# Patient Record
Sex: FEMALE | Race: WHITE | NOT HISPANIC OR LATINO | Employment: OTHER | RURAL
[De-identification: names, ages, dates, MRNs, and addresses within clinical notes are randomized per-mention and may not be internally consistent; named-entity substitution may affect disease eponyms.]

---

## 2023-01-16 ENCOUNTER — PATIENT ROUNDING (BHMG ONLY) (OUTPATIENT)
Dept: FAMILY MEDICINE CLINIC | Facility: CLINIC | Age: 68
End: 2023-01-16
Payer: MEDICARE

## 2023-01-16 ENCOUNTER — OFFICE VISIT (OUTPATIENT)
Dept: FAMILY MEDICINE CLINIC | Facility: CLINIC | Age: 68
End: 2023-01-16
Payer: MEDICARE

## 2023-01-16 VITALS
SYSTOLIC BLOOD PRESSURE: 132 MMHG | DIASTOLIC BLOOD PRESSURE: 98 MMHG | TEMPERATURE: 97.1 F | WEIGHT: 150.4 LBS | OXYGEN SATURATION: 97 % | HEIGHT: 65 IN | RESPIRATION RATE: 18 BRPM | BODY MASS INDEX: 25.06 KG/M2 | HEART RATE: 124 BPM

## 2023-01-16 DIAGNOSIS — E11.8 TYPE 2 DIABETES WITH COMPLICATION: ICD-10-CM

## 2023-01-16 DIAGNOSIS — I70.90 ATHEROSCLEROSIS: ICD-10-CM

## 2023-01-16 DIAGNOSIS — Z76.89 ENCOUNTER TO ESTABLISH CARE: Primary | ICD-10-CM

## 2023-01-16 DIAGNOSIS — Z86.73 STATUS POST CVA: ICD-10-CM

## 2023-01-16 DIAGNOSIS — E55.9 VITAMIN D DEFICIENCY: ICD-10-CM

## 2023-01-16 DIAGNOSIS — Z72.0 TOBACCO USER: ICD-10-CM

## 2023-01-16 DIAGNOSIS — I10 ESSENTIAL HYPERTENSION: ICD-10-CM

## 2023-01-16 DIAGNOSIS — E78.2 MIXED HYPERLIPIDEMIA: ICD-10-CM

## 2023-01-16 DIAGNOSIS — Z79.01 ANTICOAGULATED: ICD-10-CM

## 2023-01-16 DIAGNOSIS — M19.90 OSTEOARTHRITIS, UNSPECIFIED OSTEOARTHRITIS TYPE, UNSPECIFIED SITE: ICD-10-CM

## 2023-01-16 DIAGNOSIS — R25.1 TREMOR OF BOTH HANDS: ICD-10-CM

## 2023-01-16 DIAGNOSIS — R60.0 LEG EDEMA: ICD-10-CM

## 2023-01-16 DIAGNOSIS — F41.8 MIXED ANXIETY DEPRESSIVE DISORDER: ICD-10-CM

## 2023-01-16 DIAGNOSIS — J44.9 CHRONIC OBSTRUCTIVE PULMONARY DISEASE, UNSPECIFIED COPD TYPE: ICD-10-CM

## 2023-01-16 PROBLEM — Z00.00 WELLNESS EXAMINATION: Status: ACTIVE | Noted: 2023-01-16

## 2023-01-16 PROBLEM — G43.909 MIGRAINE: Status: ACTIVE | Noted: 2023-01-16

## 2023-01-16 PROBLEM — E78.5 HYPERLIPEMIA: Status: ACTIVE | Noted: 2022-09-14

## 2023-01-16 PROBLEM — I65.29 CAROTID ARTERY STENOSIS: Status: ACTIVE | Noted: 2023-01-16

## 2023-01-16 PROBLEM — E53.8 B12 DEFICIENCY: Status: ACTIVE | Noted: 2023-01-16

## 2023-01-16 PROBLEM — G47.00 INSOMNIA: Status: ACTIVE | Noted: 2023-01-16

## 2023-01-16 PROCEDURE — 99204 OFFICE O/P NEW MOD 45 MIN: CPT | Performed by: FAMILY MEDICINE

## 2023-01-16 RX ORDER — ASPIRIN 81 MG/1
81 TABLET ORAL DAILY
COMMUNITY

## 2023-01-16 RX ORDER — CYANOCOBALAMIN (VITAMIN B-12) 1000 MCG
TABLET ORAL
COMMUNITY

## 2023-01-16 RX ORDER — ATORVASTATIN CALCIUM 80 MG/1
80 TABLET, FILM COATED ORAL NIGHTLY
COMMUNITY
Start: 2022-09-06 | End: 2023-01-17 | Stop reason: SDUPTHER

## 2023-01-16 RX ORDER — INSULIN GLARGINE 100 [IU]/ML
24 INJECTION, SOLUTION SUBCUTANEOUS NIGHTLY
COMMUNITY
Start: 2022-09-06 | End: 2023-01-17 | Stop reason: SDUPTHER

## 2023-01-16 RX ORDER — VENLAFAXINE HYDROCHLORIDE 75 MG/1
75 CAPSULE, EXTENDED RELEASE ORAL NIGHTLY
COMMUNITY
Start: 2022-09-06 | End: 2023-01-17 | Stop reason: SDUPTHER

## 2023-01-16 RX ORDER — METFORMIN HYDROCHLORIDE 750 MG/1
500 TABLET, EXTENDED RELEASE ORAL DAILY
COMMUNITY
Start: 2022-09-06 | End: 2023-01-17 | Stop reason: SDUPTHER

## 2023-01-16 RX ORDER — LISINOPRIL 10 MG/1
10 TABLET ORAL EVERY MORNING
COMMUNITY
Start: 2022-09-06 | End: 2023-03-16 | Stop reason: SDUPTHER

## 2023-01-16 NOTE — PROGRESS NOTES
"Subjective   Nanci Benitez is a 67 y.o. female presenting with chief complaint of:   Chief Complaint   Patient presents with   • Establish Care     \"Moved from [place]\"     History of Present Illness :  Alone.  Here for primarily to establish care having moved 9/2022 from [facility] [place] to live with family.       Has multiple chronic problems to consider that might have a bearing on today's issues; an initial encounter.     Chronic/acute problems reviewed today:    Establish care: see above   1. Atherosclerosis: carotid chronic/stable various areas of disease.  Saw in [place] vascular regularly and no plans for upcoming interventions.  Denies development/change in chest pain, claudication, CVA-TIA symptoms such as (Manifest by slurred speech asymmetry of face dysfunction/weakness of extremities).  Blood thinners noted.      2. Essential hypertension  Chronic/unstable. Above targets today/without home blood pressures.  No significant chest pain, SOB, LE edema, orthopnea, near syncope, dizziness/light headness.  Agrees to follow next few days and let us know if stays elevated.   Recent Vitals       1/16/2023 1/16/2023          BP: 162/100 132/98      Pulse: 124 --      Temp: 97.1 °F (36.2 °C) --      Weight: 68.2 kg (150 lb 6.4 oz) --      BMI (Calculated): 25 --             3. Mixed hyperlipidemia Chronic/stable.  Tolerated use of Rx with labs showing improved lipid values and tolerant liver labs. No muscle aches unexpected.      4. Type 2 diabetes with complication (HCC) Chronic/unknown status without home checks.  No problem/pattern hypoglycemia/hyperglycemia manifest by poly- dypsia, phagia, uria, or sweats, diaphoretic episodes, syncope/near.     5. Vitamin D deficiency chronic/variable up/down with past labs and/or risk to run low especially in winter. Lab monitored.      6. Mixed anxiety depressive disorder chronic recently worse feelings of anxiety especially at night making it difficult to go to sleep.  Never " suicidal.   7. Anticoagulated: CVA/ASA 81 Chronic/stable reason for stopping or use of.  Denies bleeding issues; especially epistaxis, melena, hematochezia.  Upper arms/others do not significantly bruise easily.  No significant bleeding or falls.      8. Osteoarthritis, unspecified osteoarthritis type, unspecified site chronic slowly worsening discomfort is primarily spinal; no history of MRIs or consideration for surgeries.  No joint swell   9. Chronic obstructive pulmonary disease, unspecified COPD type (HCC) chronic stable mild shortness of breath occasional cough without hemoptysis or significant shortness of breath   10. Tremor of both hands right greater than left upper extremity tremors that come and go.  Beginning to affect her abilities to right and use her dominant hand.  No family history of Parkinson's   11. Status post CVA-2021 she has had she had a stroke and TIAs that left her with no residual.  Her carotids were thought to be the problem but no surgery was needed and she was started on aspirin.   12. Leg edema the last 1 to 2 months she has developed lower extremity edema that worsens during the day.  No history of DVT or CHF.   13. Tobacco user chronically has smoked all of her adult life and continues to smoke.  Has COPD with some cough and has not had any low-dose CT scanning.  Denies hemoptysis.     Has an/another acute issue today: none.    The following portions of the patient's history were reviewed and updated as appropriate: allergies, current medications, past family history, past medical history, past social history, past surgical history and problem list.     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 Twice weekly, Disp: , Rfl:   •  Insulin Glargine (BASAGLAR KWIKPEN) 100 UNIT/ML injection pen, Inject 24 Units under the skin into the appropriate area as directed Every Night., Disp: , Rfl:   •  linagliptin (TRADJENTA) 5 MG tablet tablet, Take 5 mg by mouth Every Morning., Disp: , Rfl:   •  lisinopril (PRINIVIL,ZESTRIL) 10 MG tablet, Take 10 mg by mouth Every Morning., Disp: , Rfl:   •  metFORMIN ER (GLUCOPHAGE-XR) 750 MG 24 hr tablet, Take 500 mg by mouth Daily., Disp: , Rfl:   •  venlafaxine XR (EFFEXOR-XR) 75 MG 24 hr capsule, Take 75 mg by mouth Every Night., Disp: , Rfl:     No problems with medications.    Allergies   Allergen Reactions   • Meloxicam Other (See Comments) and Palpitations   • Metformin Diarrhea     Cant take the IR, can take XR or ER       Review of Systems  GENERAL:  Active/slower with limits, speed, stamina for age. Sleep is difficult falling; apnea denied. No fever now/recent.  SKIN: No rash/skin lesion of concern unless/other than that above  ENDO:  No syncope, near or diaphoretic sweaty spells.  BS ?.  HEENT: No recent head injury; same rare headache.  No vision change.  No significant hearing loss.  Ears without pain/drainage.  No sore throat.  No significant nasal/sinus congestion/drainage. No epistaxis.  CHEST: No chest wall tenderness or mass. No significant but occ cough, without wheeze.  No SOB; no hemoptysis.  CV: No exertional chest pain, palpitations; new/recent end of day equal LE ankle edema.  GI: No dysphagia or heartburn.  No abdominal pain,  "diarrhea, constipation.  No rectal bleeding, or melena.    :  Voids without dysuria; no incontinence to completion.  ORTHO: No painful/swollen joints but various on /off sore.  Daily some mild sore neck or back.  No acute neck or back pain without recent injury.  NEURO: No focal/significant weakness of extremities. No dizziness.   No numbness/paresthesias.   PSYCH: No memory loss.  Mood/variable; occ anxious, depressed but/and not suicidal.  Tries to tolerate stress .   Screening:  Gyne: years  Mammogram: ?/discussed  Bone density: ?/discussed  Low dose CT chest: never/discussed  GI: none/encouraged  Prostate: NA  Usual lab order  To establish    Copy/paste function used for ROS/exam AND each area of these were reviewed, updated, confirmed and supplemented as needed.  Data reviewed:   Recent admit/ER/MD visits: inported linked diagnosis  Last cardiac testing:   Echo: none found in care everywhere    Radiology considered:   None found in care everywhere    Lab Results:  None    Objective   /98 (BP Location: Right arm, Patient Position: Sitting)   Pulse (!) 124   Temp 97.1 °F (36.2 °C) (Infrared)   Resp 18   Ht 165.1 cm (65\")   Wt 68.2 kg (150 lb 6.4 oz)   SpO2 97%   BMI 25.03 kg/m²   Body mass index is 25.03 kg/m².    Recent Vitals       1/16/2023 1/16/2023          BP: 162/100 132/98      Pulse: 124 --      Temp: 97.1 °F (36.2 °C) --      Weight: 68.2 kg (150 lb 6.4 oz) --      BMI (Calculated): 25 --          Says weight stable    Physical Exam  GENERAL:  Well nourished/developed in no acute distress.   SKIN: Turgor ok without wound, rash, lesion  HEENT: Normal cephalic without trauma.  Pupils equal round reactive to light. Extraocular motions full without nystagmus.   External canals nonobstructive nontender without reddness. Tymphatic membranes jeevan with delfin structures intact.   Oral cavity without growths, exudates, and moist.  Posterior pharynx without mass, obstruction; mild/diffuse redness.  " No thyromegaly, mass, tenderness, lymphadenopathy and supple.  CV: Regular rhythm.  No murmur, gallop; 1/4 ankle equal LE/pitting edema. Posterior pulses intact.  No carotid bruits.  CHEST: No chest wall tenderness or mass.   LUNGS: Symmetric motion with clear to auscultation.  No dullness to percussion  ABD: Soft, nontender without mass.   ORTHO: Symmetric extremities without swelling/point tenderness (L index finger distal phalynx surgically gone).  Full gross range of motion.  NEURO: CN 2-12 grossly intact.  Symmetric facies and UE/LE. 2-3/5 strength throughout. 1/4 x bicep knee equal reflexes.  Nonfocal use extremities. Speech clear.  Intact light touch with monofilament, vibratory sensation with tuning fork; equal toes/distal feet.    PSYCH: Oriented x 3.  Pleasant calm, well kept.  Purposeful/directed conservation with intact short/long gross memory.     Assessment & Plan     1. Encounter to establish care    2. Atherosclerosis: carotid    3. Essential hypertension    4. Mixed hyperlipidemia    5. Type 2 diabetes with complication (HCC)    6. Vitamin D deficiency    7. Mixed anxiety depressive disorder    8. Anticoagulated: CVA/ASA 81    9. Osteoarthritis, unspecified osteoarthritis type, unspecified site    10. Chronic obstructive pulmonary disease, unspecified COPD type (HCC)    11. Tremor of both hands    12. Status post CVA-2021    13. Leg edema    14. Tobacco user        Data review above:   Discussions/medical decisions/reviews:  BP too high  Other vitals second pulse 70; regular  Labs due    Labs today; then decisions on edema, tremor/Rx, DM status and others  All screening due  Needs vascular minimum    Data review above:   Rx: reviewed and decisions:   Rx new/changes:   No orders of the defined types were placed in this encounter.    Orders placed:   LAB/Testing/Referrals: reviewed/orders:   Today:   Orders Placed This Encounter   Procedures   • Comprehensive metabolic panel   • Lipid Panel With  LDL/HDL Ratio   • TSH   • Hemoglobin A1c   • Vitamin D,25-Hydroxy   • Ambulatory Referral to Vascular Surgery   • CBC and Differential     Chronic/recurrent labs above or change to:   To establish    Screening reviewed/updated noting deficiencies    Immunization History   Administered Date(s) Administered   • COVID-19 (MODERNA) 1st, 2nd, 3rd Dose Only 03/10/2021, 04/10/2021   • COVID-19 (MODERNA) BOOSTER 10/29/2021   • Fluzone High-Dose 65+yrs 09/29/2021   • INFLUENZA SPLIT TRI 10/13/2015   • Pneumococcal Polysaccharide (PPSV23) 01/04/2021     Vaccine reviewed: today none; later we advised/reaffirmed our support/suggestion for staying complete with covid- covid boosters, seasonal flu/yearly and any missing vaccine from list we supplied; we suggest contact with local health department office to review missing/needed vaccines and then bring nursing documentation for these vaccines to this office.     Health maintenance:   Body mass index is 25.03 kg/m².  BMI is >= 25 and <30. (Overweight) The following options were offered after discussion;: exercise counseling/recommendations and nutrition counseling/recommendations      Tobacco use reviewed:   Nanci Benitez  reports that she has been smoking cigarettes. She started smoking about 53 years ago. She has a 2.00 pack-year smoking history. She has never used smokeless tobacco.. I have educated her on the risk of diseases from using tobacco products such as cancer, COPD and heart disease.     I advised her to quit and she is not willing to quit.    I spent 3  minutes counseling the patient.      There are no Patient Instructions on file for this visit.    Visit today involved chronic significant medical problems or differentials and/or intensive drug monitoring: ie potential to cause serious morbidity or death:     Follow up: Return for lab today then Dr Ortiz 2m.  Future Appointments   Date Time Provider Department Center   3/16/2023  2:15 PM Carlitos Ortiz MD [facility]

## 2023-01-16 NOTE — PROGRESS NOTES
January 16, 2023    Hello, may I speak with Nanci Benitez?    My name is Clarisa     I am  with [facility]  [address] [facility] [id number] [phone].    Before we get started may I verify your date of birth? 1955    I am calling to officially welcome you to our practice and ask about your recent visit. Is this a good time to talk? Yes     Tell me about your visit with us. What things went well?  The office visit went well today.        We're always looking for ways to make our patients' experiences even better. Do you have recommendations on ways we may improve?  No, everything went well today.     Overall were you satisfied with your first visit to our practice? Yes, it was a good visit.        I appreciate you taking the time to speak with me today. Is there anything else I can do for you? No , as long as medication was sent I am good.       Thank you, and have a great day.

## 2023-01-17 DIAGNOSIS — Z87.891 PERSONAL HISTORY OF NICOTINE DEPENDENCE: ICD-10-CM

## 2023-01-17 DIAGNOSIS — E11.8 TYPE 2 DIABETES WITH COMPLICATION: ICD-10-CM

## 2023-01-17 DIAGNOSIS — G47.00 INSOMNIA, UNSPECIFIED TYPE: ICD-10-CM

## 2023-01-17 DIAGNOSIS — R60.0 LEG EDEMA: Primary | ICD-10-CM

## 2023-01-17 DIAGNOSIS — Z12.2 ENCOUNTER FOR SCREENING FOR LUNG CANCER: ICD-10-CM

## 2023-01-17 DIAGNOSIS — E78.2 MIXED HYPERLIPIDEMIA: Primary | ICD-10-CM

## 2023-01-17 DIAGNOSIS — R06.02 SHORTNESS OF BREATH: ICD-10-CM

## 2023-01-17 DIAGNOSIS — F41.8 MIXED ANXIETY DEPRESSIVE DISORDER: ICD-10-CM

## 2023-01-17 LAB
25(OH)D3+25(OH)D2 SERPL-MCNC: 23.4 NG/ML (ref 30–100)
ALBUMIN SERPL-MCNC: 4.8 G/DL (ref 3.5–5.2)
ALBUMIN/GLOB SERPL: 2.3 G/DL
ALP SERPL-CCNC: 121 U/L (ref 39–117)
ALT SERPL-CCNC: 21 U/L (ref 1–33)
AST SERPL-CCNC: 15 U/L (ref 1–32)
BASOPHILS # BLD AUTO: 0.1 10*3/MM3 (ref 0–0.2)
BASOPHILS NFR BLD AUTO: 0.8 % (ref 0–1.5)
BILIRUB SERPL-MCNC: 0.4 MG/DL (ref 0–1.2)
BUN SERPL-MCNC: 10 MG/DL (ref 8–23)
BUN/CREAT SERPL: 12.5 (ref 7–25)
CALCIUM SERPL-MCNC: 9.8 MG/DL (ref 8.6–10.5)
CHLORIDE SERPL-SCNC: 99 MMOL/L (ref 98–107)
CHOLEST SERPL-MCNC: 363 MG/DL (ref 0–200)
CO2 SERPL-SCNC: 27.4 MMOL/L (ref 22–29)
CREAT SERPL-MCNC: 0.8 MG/DL (ref 0.57–1)
EGFRCR SERPLBLD CKD-EPI 2021: 80.9 ML/MIN/1.73
EOSINOPHIL # BLD AUTO: 0.19 10*3/MM3 (ref 0–0.4)
EOSINOPHIL NFR BLD AUTO: 1.6 % (ref 0.3–6.2)
ERYTHROCYTE [DISTWIDTH] IN BLOOD BY AUTOMATED COUNT: 12.1 % (ref 12.3–15.4)
GLOBULIN SER CALC-MCNC: 2.1 GM/DL
GLUCOSE SERPL-MCNC: 234 MG/DL (ref 65–99)
HBA1C MFR BLD: 9.3 % (ref 4.8–5.6)
HCT VFR BLD AUTO: 45.6 % (ref 34–46.6)
HDLC SERPL-MCNC: 47 MG/DL (ref 40–60)
HGB BLD-MCNC: 15.1 G/DL (ref 12–15.9)
IMM GRANULOCYTES # BLD AUTO: 0.04 10*3/MM3 (ref 0–0.05)
IMM GRANULOCYTES NFR BLD AUTO: 0.3 % (ref 0–0.5)
LDLC SERPL CALC-MCNC: 207 MG/DL (ref 0–100)
LDLC/HDLC SERPL: 4.57 {RATIO}
LYMPHOCYTES # BLD AUTO: 2.52 10*3/MM3 (ref 0.7–3.1)
LYMPHOCYTES NFR BLD AUTO: 20.7 % (ref 19.6–45.3)
MCH RBC QN AUTO: 30.3 PG (ref 26.6–33)
MCHC RBC AUTO-ENTMCNC: 33.1 G/DL (ref 31.5–35.7)
MCV RBC AUTO: 91.4 FL (ref 79–97)
MONOCYTES # BLD AUTO: 1.35 10*3/MM3 (ref 0.1–0.9)
MONOCYTES NFR BLD AUTO: 11.1 % (ref 5–12)
NEUTROPHILS # BLD AUTO: 7.97 10*3/MM3 (ref 1.7–7)
NEUTROPHILS NFR BLD AUTO: 65.5 % (ref 42.7–76)
NRBC BLD AUTO-RTO: 0 /100 WBC (ref 0–0.2)
PLATELET # BLD AUTO: 266 10*3/MM3 (ref 140–450)
POTASSIUM SERPL-SCNC: 3.9 MMOL/L (ref 3.5–5.2)
PROT SERPL-MCNC: 6.9 G/DL (ref 6–8.5)
RBC # BLD AUTO: 4.99 10*6/MM3 (ref 3.77–5.28)
SODIUM SERPL-SCNC: 139 MMOL/L (ref 136–145)
TRIGL SERPL-MCNC: 506 MG/DL (ref 0–150)
TSH SERPL DL<=0.005 MIU/L-ACNC: 3.6 UIU/ML (ref 0.27–4.2)
VLDLC SERPL CALC-MCNC: 109 MG/DL (ref 5–40)
WBC # BLD AUTO: 12.17 10*3/MM3 (ref 3.4–10.8)

## 2023-01-17 RX ORDER — INSULIN GLARGINE 100 [IU]/ML
24 INJECTION, SOLUTION SUBCUTANEOUS NIGHTLY
Qty: 9 ML | Refills: 0 | Status: SHIPPED | OUTPATIENT
Start: 2023-01-17 | End: 2023-01-19 | Stop reason: DRUGHIGH

## 2023-01-17 RX ORDER — ATORVASTATIN CALCIUM 80 MG/1
80 TABLET, FILM COATED ORAL NIGHTLY
Qty: 90 TABLET | Refills: 0 | Status: SHIPPED | OUTPATIENT
Start: 2023-01-17

## 2023-01-17 RX ORDER — METFORMIN HYDROCHLORIDE 750 MG/1
750 TABLET, EXTENDED RELEASE ORAL DAILY
Qty: 90 TABLET | Refills: 0 | Status: SHIPPED | OUTPATIENT
Start: 2023-01-17 | End: 2023-01-19 | Stop reason: DRUGHIGH

## 2023-01-17 RX ORDER — VENLAFAXINE HYDROCHLORIDE 75 MG/1
75 CAPSULE, EXTENDED RELEASE ORAL NIGHTLY
Qty: 90 CAPSULE | Refills: 0 | Status: SHIPPED | OUTPATIENT
Start: 2023-01-17

## 2023-01-17 NOTE — TELEPHONE ENCOUNTER
Caller: Nanci Benitez    Relationship: Self    Best call back number: [phone]    90 DAYS SUPPLY ON ALL MEDICATIONS  Requested Prescriptions:   Requested Prescriptions     Pending Prescriptions Disp Refills   • metFORMIN ER (GLUCOPHAGE-XR) 750 MG 24 hr tablet       Sig: Take 1 tablet by mouth Daily.   • atorvastatin (LIPITOR) 80 MG tablet 90 tablet      Sig: Take 1 tablet by mouth Every Night.   • Insulin Glargine (BASAGLAR KWIKPEN) 100 UNIT/ML injection pen       Sig: Inject 24 Units under the skin into the appropriate area as directed Every Night.   • linagliptin (TRADJENTA) 5 MG tablet tablet 30 tablet      Sig: Take 1 tablet by mouth Every Morning.   • venlafaxine XR (EFFEXOR-XR) 75 MG 24 hr capsule       Sig: Take 1 capsule by mouth Every Night.        Pharmacy where request should be sent: [facility] [address] [place], [place] [phone] [phone]      Additional details provided by patient: OUT OF MEDICATIONS    Does the patient have less than a 3 day supply:  [x] Yes  [] No    Would you like a call back once the refill request has been completed: [x] Yes [] No    If the office needs to give you a call back, can they leave a voicemail: [x] Yes [] No    Shan Gonzalez Rep   01/17/23 09:41 CST

## 2023-01-17 NOTE — TELEPHONE ENCOUNTER
Rx Refill Note  Requested Prescriptions     Pending Prescriptions Disp Refills   • metFORMIN ER (GLUCOPHAGE-XR) 750 MG 24 hr tablet       Sig: Take 1 tablet by mouth Daily.   • atorvastatin (LIPITOR) 80 MG tablet 90 tablet      Sig: Take 1 tablet by mouth Every Night.   • Insulin Glargine (BASAGLAR KWIKPEN) 100 UNIT/ML injection pen       Sig: Inject 24 Units under the skin into the appropriate area as directed Every Night.   • linagliptin (TRADJENTA) 5 MG tablet tablet 30 tablet      Sig: Take 1 tablet by mouth Every Morning.   • venlafaxine XR (EFFEXOR-XR) 75 MG 24 hr capsule       Sig: Take 1 capsule by mouth Every Night.      Last office visit with prescribing clinician: 1/16/2023      Next office visit with prescribing clinician: 3/16/2023            Doreen Elliott LPN  01/17/23, 10:03 CST

## 2023-01-19 RX ORDER — ERGOCALCIFEROL 1.25 MG/1
50000 CAPSULE ORAL WEEKLY
Qty: 5 CAPSULE | Refills: 0 | Status: SHIPPED | OUTPATIENT
Start: 2023-01-19 | End: 2023-03-16

## 2023-01-19 RX ORDER — MELATONIN
1000 2 TIMES DAILY
Qty: 180 TABLET | Refills: 1 | Status: SHIPPED | OUTPATIENT
Start: 2023-01-19

## 2023-01-19 RX ORDER — INSULIN GLARGINE 100 [IU]/ML
30 INJECTION, SOLUTION SUBCUTANEOUS DAILY
Qty: 27 ML | Refills: 1 | Status: SHIPPED | OUTPATIENT
Start: 2023-01-19

## 2023-01-19 RX ORDER — METFORMIN HYDROCHLORIDE 750 MG/1
750 TABLET, EXTENDED RELEASE ORAL 2 TIMES DAILY
Qty: 180 TABLET | Refills: 1 | Status: SHIPPED | OUTPATIENT
Start: 2023-01-19

## 2023-02-06 ENCOUNTER — TELEPHONE (OUTPATIENT)
Dept: VASCULAR SURGERY | Facility: CLINIC | Age: 68
End: 2023-02-06
Payer: MEDICARE

## 2023-02-06 DIAGNOSIS — I65.23 BILATERAL CAROTID ARTERY STENOSIS: Primary | ICD-10-CM

## 2023-02-06 DIAGNOSIS — I65.29 STENOSIS OF CAROTID ARTERY, UNSPECIFIED LATERALITY: ICD-10-CM

## 2023-02-06 DIAGNOSIS — I70.90 ATHEROSCLEROSIS: ICD-10-CM

## 2023-02-06 NOTE — TELEPHONE ENCOUNTER
UNABLE TO CONFIRM APPOINTMENT WITH [facility] ON 2/7/23. LEFT VM WITH TIME, LOCATION AND CONTACT INFO. IF PATIENT IS TO CALL BACK, PLEASE ASK ABOUT TESTING DONE AND ASK IF THEY COULD BRING A COPY.

## 2023-02-14 ENCOUNTER — HOSPITAL ENCOUNTER (OUTPATIENT)
Dept: CARDIOLOGY | Facility: HOSPITAL | Age: 68
Discharge: HOME | End: 2023-02-14
Payer: MEDICARE

## 2023-02-14 ENCOUNTER — HOSPITAL ENCOUNTER (OUTPATIENT)
Dept: CT IMAGING | Facility: HOSPITAL | Age: 68
Discharge: HOME | End: 2023-02-14
Payer: MEDICARE

## 2023-02-14 DIAGNOSIS — R06.02 SHORTNESS OF BREATH: ICD-10-CM

## 2023-02-14 PROCEDURE — 93306 TTE W/DOPPLER COMPLETE: CPT | Performed by: INTERNAL MEDICINE

## 2023-02-14 PROCEDURE — 93306 TTE W/DOPPLER COMPLETE: CPT

## 2023-02-14 PROCEDURE — 71271 CT THORAX LUNG CANCER SCR C-: CPT

## 2023-02-15 ENCOUNTER — TELEPHONE (OUTPATIENT)
Dept: FAMILY MEDICINE CLINIC | Facility: CLINIC | Age: 68
End: 2023-02-15

## 2023-02-15 DIAGNOSIS — R60.0 LEG EDEMA: Primary | ICD-10-CM

## 2023-02-15 LAB
BH CV ECHO LEFT VENTRICLE GLOBAL LONGITUDINAL STRAIN: -9.1 %
BH CV ECHO MEAS - AO MAX PG: 7.6 MMHG
BH CV ECHO MEAS - AO MEAN PG: 4 MMHG
BH CV ECHO MEAS - AO ROOT DIAM: 3.2 CM
BH CV ECHO MEAS - AO V2 MAX: 138 CM/SEC
BH CV ECHO MEAS - AO V2 VTI: 25 CM
BH CV ECHO MEAS - AVA(I,D): 2.5 CM2
BH CV ECHO MEAS - EDV(CUBED): 74.1 ML
BH CV ECHO MEAS - EDV(MOD-SP4): 35.5 ML
BH CV ECHO MEAS - EF(MOD-BP): 65 %
BH CV ECHO MEAS - EF(MOD-SP4): 65.4 %
BH CV ECHO MEAS - ESV(CUBED): 24.4 ML
BH CV ECHO MEAS - ESV(MOD-SP4): 12.3 ML
BH CV ECHO MEAS - FS: 31 %
BH CV ECHO MEAS - IVS/LVPW: 0.88 CM
BH CV ECHO MEAS - IVSD: 0.7 CM
BH CV ECHO MEAS - LA DIMENSION: 3.3 CM
BH CV ECHO MEAS - LAT PEAK E' VEL: 7.2 CM/SEC
BH CV ECHO MEAS - LV DIASTOLIC VOL/BSA (35-75): 20.3 CM2
BH CV ECHO MEAS - LV MASS(C)D: 93 GRAMS
BH CV ECHO MEAS - LV MAX PG: 4.3 MMHG
BH CV ECHO MEAS - LV MEAN PG: 2 MMHG
BH CV ECHO MEAS - LV SYSTOLIC VOL/BSA (12-30): 7 CM2
BH CV ECHO MEAS - LV V1 MAX: 104 CM/SEC
BH CV ECHO MEAS - LV V1 VTI: 20.1 CM
BH CV ECHO MEAS - LVIDD: 4.2 CM
BH CV ECHO MEAS - LVIDS: 2.9 CM
BH CV ECHO MEAS - LVOT AREA: 3.1 CM2
BH CV ECHO MEAS - LVOT DIAM: 2 CM
BH CV ECHO MEAS - LVPWD: 0.8 CM
BH CV ECHO MEAS - MED PEAK E' VEL: 5 CM/SEC
BH CV ECHO MEAS - MV A MAX VEL: 101 CM/SEC
BH CV ECHO MEAS - MV DEC SLOPE: 668 CM/SEC2
BH CV ECHO MEAS - MV DEC TIME: 0.17 MSEC
BH CV ECHO MEAS - MV E MAX VEL: 72 CM/SEC
BH CV ECHO MEAS - MV E/A: 0.71
BH CV ECHO MEAS - MV P1/2T: 45.2 MSEC
BH CV ECHO MEAS - MVA(P1/2T): 4.9 CM2
BH CV ECHO MEAS - PA V2 MAX: 84.2 CM/SEC
BH CV ECHO MEAS - RAP SYSTOLE: 5 MMHG
BH CV ECHO MEAS - RVSP: 34.8 MMHG
BH CV ECHO MEAS - SI(MOD-SP4): 13.3 ML/M2
BH CV ECHO MEAS - SV(LVOT): 63.1 ML
BH CV ECHO MEAS - SV(MOD-SP4): 23.2 ML
BH CV ECHO MEAS - TR MAX PG: 29.8 MMHG
BH CV ECHO MEAS - TR MAX VEL: 273 CM/SEC
BH CV ECHO MEASUREMENTS AVERAGE E/E' RATIO: 11.8
BH CV XLRA - TDI S': 12.5 CM/SEC
LEFT ATRIUM VOLUME INDEX: 14.3 ML/M2
MAXIMAL PREDICTED HEART RATE: 153 BPM
STRESS TARGET HR: 130 BPM

## 2023-02-15 NOTE — TELEPHONE ENCOUNTER
Caller: Loyd Benitez    Relationship: Self    Best call back number:  [phone]      Who are you requesting to speak with     CLINICAL STAFF      Do you know the name of the person who called:     LOYD    What was the call regarding:     RETURNING CALL FOR TEST RESULTS    Do you require a callback:     YES, PLEASE ADVISE

## 2023-02-22 ENCOUNTER — TELEPHONE (OUTPATIENT)
Dept: VASCULAR SURGERY | Facility: CLINIC | Age: 68
End: 2023-02-22
Payer: MEDICARE

## 2023-02-23 ENCOUNTER — HOSPITAL ENCOUNTER (OUTPATIENT)
Dept: ULTRASOUND IMAGING | Facility: HOSPITAL | Age: 68
Discharge: HOME | End: 2023-02-23
Admitting: NURSE PRACTITIONER
Payer: MEDICARE

## 2023-02-23 ENCOUNTER — OFFICE VISIT (OUTPATIENT)
Dept: VASCULAR SURGERY | Facility: CLINIC | Age: 68
End: 2023-02-23
Payer: MEDICARE

## 2023-02-23 VITALS
WEIGHT: 150 LBS | SYSTOLIC BLOOD PRESSURE: 128 MMHG | HEIGHT: 65 IN | OXYGEN SATURATION: 98 % | HEART RATE: 82 BPM | BODY MASS INDEX: 24.99 KG/M2 | DIASTOLIC BLOOD PRESSURE: 78 MMHG

## 2023-02-23 DIAGNOSIS — I65.21 RIGHT CAROTID ARTERY OCCLUSION: ICD-10-CM

## 2023-02-23 DIAGNOSIS — Z72.0 TOBACCO USER: ICD-10-CM

## 2023-02-23 DIAGNOSIS — E78.2 MIXED HYPERLIPIDEMIA: ICD-10-CM

## 2023-02-23 DIAGNOSIS — I10 ESSENTIAL HYPERTENSION: ICD-10-CM

## 2023-02-23 DIAGNOSIS — R60.0 LEG EDEMA: ICD-10-CM

## 2023-02-23 DIAGNOSIS — I65.23 BILATERAL CAROTID ARTERY STENOSIS: ICD-10-CM

## 2023-02-23 DIAGNOSIS — Z13.6 SCREENING FOR AAA (ABDOMINAL AORTIC ANEURYSM): ICD-10-CM

## 2023-02-23 DIAGNOSIS — I65.22 STENOSIS OF LEFT CAROTID ARTERY: Primary | ICD-10-CM

## 2023-02-23 DIAGNOSIS — I65.29 STENOSIS OF CAROTID ARTERY, UNSPECIFIED LATERALITY: ICD-10-CM

## 2023-02-23 PROCEDURE — 93880 EXTRACRANIAL BILAT STUDY: CPT | Performed by: SURGERY

## 2023-02-23 PROCEDURE — 99204 OFFICE O/P NEW MOD 45 MIN: CPT | Performed by: NURSE PRACTITIONER

## 2023-02-23 PROCEDURE — 93880 EXTRACRANIAL BILAT STUDY: CPT

## 2023-02-23 NOTE — PROGRESS NOTES
02/23/2023      Carlitos Ortiz MD  [address] [place] [id number]    Nanci Benitez  1955    Chief Complaint   Patient presents with   • NEW PATIENT     Referred from Carlitos Ortiz for Atherosclerosis. Carotid done today. Patient states that she had a mini stroke back in July and September of 2021.        Dear Carlitos Ortiz MD:      HPI  I had the pleasure of seeing your patient Nanci Benitez in the office today.  Thank you kindly for this consultation.  As you recall, Nanci Benitez is a 67 y.o.  female who you are currently following for routine health maintenance.  She had mini stroke in 2021 and was found to have occluded right carotid.  At that time she had significant left-sided weakness.  She does have some discoordination of her left hand when doing some fine motor.  She reports further episodes since that time.  Currently she is maintained on aspirin and Lipitor.  She is a current 2 pack/day smoker.  I did review previous hospital discharge and her physician review her CTA of the neck showed a complete right carotid occlusion and about 50% left carotid stenosis.  I did send her for noninvasive testing, which I did review in office.    Past Medical History:   Diagnosis Date   • Arthritis    • Depression    • Diabetes (HCC)    • Emphysema of lung (HCC)    • Hypertension    • Stroke (HCC) 2021       Past Surgical History:   Procedure Laterality Date   • AMPUTATION FINGER / THUMB Left 1978    end of left index   • KNEE SURGERY Left 1986   • NOSE SURGERY  1986   • TUBAL ABDOMINAL LIGATION  1986       Family History   Problem Relation Age of Onset   • Heart failure Mother    • Kidney disease Mother    • Cancer Father    • Cancer Sister    • Heart attack Brother    • Heart failure Son        Social History     Socioeconomic History   • Marital status:    • Number of children: 1   • Years of education: 12   • Highest education level: High school graduate   Tobacco Use   • Smoking status:  "Every Day     Packs/day: 2.00     Years: 1.00     Pack years: 2.00     Types: Cigarettes     Start date: 12/31/1969   • Smokeless tobacco: Never   Vaping Use   • Vaping Use: Never used   Substance and Sexual Activity   • Alcohol use: Yes     Comment: mixed drink once a month   • Drug use: Never   • Sexual activity: Not Currently       Allergies   Allergen Reactions   • Meloxicam Other (See Comments) and Palpitations   • Metformin Diarrhea     Cant take the IR, can take XR or ER       Current Outpatient Medications   Medication Instructions   • aspirin 81 mg, Oral, Daily   • atorvastatin (LIPITOR) 80 mg, Oral, Nightly   • BASAGLAR KWIKPEN 30 Units, Subcutaneous, Daily   • cholecalciferol (VITAMIN D3) 1,000 Units, Oral, 2 Times Daily   • Cyanocobalamin (B-12) 1000 MCG tablet Oral, Twice weekly   • linagliptin (TRADJENTA) 5 mg, Oral, Every Morning   • lisinopril (PRINIVIL,ZESTRIL) 10 mg, Oral, Every Morning   • metFORMIN ER (GLUCOPHAGE-XR) 750 mg, Oral, 2 Times Daily   • Thiamine HCl (VITAMIN B1 PO) 1 tablet, Oral, Daily   • venlafaxine XR (EFFEXOR-XR) 75 mg, Oral, Nightly   • vitamin D (ERGOCALCIFEROL) 50,000 Units, Oral, Weekly         Review of Systems   Constitutional: Negative.    HENT: Negative.    Eyes: Negative.    Respiratory: Negative.    Cardiovascular: Negative.    Gastrointestinal: Negative.    Endocrine: Negative.    Genitourinary: Negative.    Musculoskeletal: Negative.    Skin: Negative.    Allergic/Immunologic: Negative.    Neurological: Positive for tremors.   Hematological: Negative.    Psychiatric/Behavioral: Negative.        /78   Pulse 82   Ht 165.1 cm (65\")   Wt 68 kg (150 lb)   SpO2 98%   BMI 24.96 kg/m²   Physical Exam  Vitals and nursing note reviewed.   Constitutional:       General: She is not in acute distress.     Appearance: Normal appearance. She is well-developed and normal weight. She is not diaphoretic.   HENT:      Head: Normocephalic and atraumatic.   Eyes:      General: " No scleral icterus.     Pupils: Pupils are equal, round, and reactive to light.   Neck:      Thyroid: No thyromegaly.      Vascular: No carotid bruit or JVD.   Cardiovascular:      Rate and Rhythm: Normal rate and regular rhythm.      Pulses: Normal pulses.      Heart sounds: Normal heart sounds and S2 normal. No murmur heard.    No friction rub. No gallop.   Pulmonary:      Effort: Pulmonary effort is normal.      Breath sounds: Normal breath sounds.   Abdominal:      General: Bowel sounds are normal.      Palpations: Abdomen is soft.   Musculoskeletal:         General: Swelling present. Normal range of motion.      Cervical back: Normal range of motion and neck supple.   Skin:     General: Skin is warm and dry.   Neurological:      General: No focal deficit present.      Mental Status: She is alert and oriented to person, place, and time.      Cranial Nerves: No cranial nerve deficit.   Psychiatric:         Mood and Affect: Mood normal.         Behavior: Behavior normal.         Thought Content: Thought content normal.         Judgment: Judgment normal.         Diagnostic data:      Adult Transthoracic Echo Complete W/ Cont if Necessary Per Protocol    Result Date: 2/15/2023  Narrative: •  Left ventricular systolic function is normal. Calculated left ventricular EF = 65% •  Left ventricular diastolic function is consistent with age. •  Normal right ventricular cavity size and systolic function noted. •  There is no significant (greater than mild) valvular dysfunction.     US Carotid Bilateral    Result Date: 2/23/2023  Narrative: History: Carotid occlusive disease      Impression: Impression: 1. There is known occlusion of the right internal carotid artery. 2. There is less than 50% stenosis of the left internal carotid artery. 3. Antegrade flow is demonstrated in bilateral vertebral arteries.  Comments: Bilateral carotid vertebral arterial duplex scan was performed.  There is a known occlusion of the right  internal carotid artery.  Grayscale imaging shows intimal thickening and calcified elements at the carotid bifurcation. The left internal carotid artery peak systolic velocity is 94.6 cm/sec. The end-diastolic velocity is 28.4 cm/sec. The left ICA/CCA ratio is approximately 1.4 . These findings correlate with less than 50% stenosis of the left internal carotid artery.  Antegrade flow is demonstrated in bilateral vertebral arteries.  This report was finalized on 02/23/2023 16:44 by Dr. Tito Moore MD.     CT Chest Low Dose Cancer Screening WO    Result Date: 2/14/2023  Narrative: EXAM/TECHNIQUE: CT chest without contrast, low-dose protocol  INDICATION: nicotine addiction/lung cancer screening; Z87.891-Personal history of nicotine dependence; Z12.2-Encounter for screening for malignant neoplasm of respiratory organs  COMPARISON: None  DLP: 55 mGy cm. Automated exposure control was also utilized to decrease patient radiation dose.  FINDINGS:  The central airways are clear. No consolidation or pleural effusion. Mild centrilobular emphysema.  5 mm LEFT upper lobe pulmonary nodule, image 44. 5 mm RIGHT upper lobe pulmonary nodule, image 56.  No enlarged thoracic lymph nodes. Main pulmonary artery is nondilated. Nonaneurysmal atherosclerotic thoracic aorta. No pericardial effusion. Moderate coronary calcification.  No large thyroid nodule. No acute chest wall soft tissue abnormality. No acute findings included portion of the upper abdomen. No acute osseous finding.      Impression:  1.  5 mm pulmonary nodule in the LEFT upper lobe and within the RIGHT upper lobe.  2.  Moderate coronary artery atherosclerotic calcification.  Lung-RADS 2: Benign Nodules with a very low likelihood of becoming a clinically active cancer due to size or lack of growth. Continue annual screening with low dose CT in 12 months. This report was finalized on 02/14/2023 14:12 by Dr. See Flowers MD.       Patient Active Problem List   Diagnosis    • Tremor of both hands   • B12 deficiency-oral   • Atherosclerosis: carotid   • COPD (chronic obstructive pulmonary disease) (HCC)   • Essential hypertension   • Hyperlipemia-statin   • Migraine   • Mixed anxiety depressive disorder   • Degenerative joint disease   • Status post CVA-2021   • Type 2 diabetes with complication (HCC)   • Vitamin D deficiency   • Tobacco user: 2.2023/12m   • Wellness examination   • Anticoagulated: CVA/ASA 81   • Leg edema   • Insomnia         ICD-10-CM ICD-9-CM   1. Stenosis of left carotid artery  I65.22 433.10   2. Right carotid artery occlusion  I65.21 433.10   3. Tobacco user: 2.2023/12m  Z72.0 305.1   4. Mixed hyperlipidemia  E78.2 272.2   5. Essential hypertension  I10 401.9   6. Leg edema  R60.0 782.3           Plan: After thoroughly evaluating Nanci Benitez, I believe the best course of action is to remain conservative from a vascular surgery standpoint.  She does have a known right carotid occlusion documented per CTA in [place].  Her duplex shows less than 50% left carotid stenosis.  We will see her back in 1 year with repeat noninvasive testing for continued surveillance, including a carotid duplex and a screening ultrasound of the aorta.  She does have some upcoming testing for venous insufficiency ordered by her primary care provider.  I did give her her prescription for compression stockings and instructed on how to wear them on a daily basis keep her legs elevated when she is not on them.  I did discuss vascular risk factors as they pertain to the progression of vascular disease including controlling her hypertension, hyperlipidemia, and smoking cessation.  Her blood pressure stable on her current medications.  She is maintained on Lipitor for hyperlipidemia.  Unfortunately she is a current daily smoker and has no desire to quit smoking at this time.  The patient can continue taking their current medication regimen as previously planned.  This was all discussed in full  with complete understanding.    Thank you for allowing me to participate in the care of your patient.  Please do not hesitate with any questions or concerns.  I will keep you aware of any further encounters with Nanci Benitez.        Sincerely yours,         PRIYA Guerrero

## 2023-03-16 ENCOUNTER — OFFICE VISIT (OUTPATIENT)
Dept: FAMILY MEDICINE CLINIC | Facility: CLINIC | Age: 68
End: 2023-03-16
Payer: MEDICARE

## 2023-03-16 VITALS
BODY MASS INDEX: 25.99 KG/M2 | WEIGHT: 156 LBS | OXYGEN SATURATION: 98 % | HEIGHT: 65 IN | RESPIRATION RATE: 14 BRPM | DIASTOLIC BLOOD PRESSURE: 82 MMHG | HEART RATE: 79 BPM | SYSTOLIC BLOOD PRESSURE: 130 MMHG

## 2023-03-16 DIAGNOSIS — I70.90 ATHEROSCLEROSIS: ICD-10-CM

## 2023-03-16 DIAGNOSIS — E11.65 UNCONTROLLED TYPE 2 DIABETES MELLITUS WITH HYPERGLYCEMIA: ICD-10-CM

## 2023-03-16 DIAGNOSIS — Z12.31 ENCOUNTER FOR SCREENING MAMMOGRAM FOR BREAST CANCER: ICD-10-CM

## 2023-03-16 DIAGNOSIS — M19.90 OSTEOARTHRITIS, UNSPECIFIED OSTEOARTHRITIS TYPE, UNSPECIFIED SITE: ICD-10-CM

## 2023-03-16 DIAGNOSIS — Z00.00 WELLNESS EXAMINATION: ICD-10-CM

## 2023-03-16 DIAGNOSIS — Z71.85 VACCINE COUNSELING: ICD-10-CM

## 2023-03-16 DIAGNOSIS — Z72.0 TOBACCO USER: ICD-10-CM

## 2023-03-16 DIAGNOSIS — E78.2 MIXED HYPERLIPIDEMIA: ICD-10-CM

## 2023-03-16 DIAGNOSIS — Z78.0 MENOPAUSE: ICD-10-CM

## 2023-03-16 DIAGNOSIS — E11.8 TYPE 2 DIABETES WITH COMPLICATION: ICD-10-CM

## 2023-03-16 DIAGNOSIS — Z79.01 ANTICOAGULATED: ICD-10-CM

## 2023-03-16 DIAGNOSIS — E55.9 VITAMIN D DEFICIENCY: ICD-10-CM

## 2023-03-16 DIAGNOSIS — I10 ESSENTIAL HYPERTENSION: ICD-10-CM

## 2023-03-16 DIAGNOSIS — J44.9 CHRONIC OBSTRUCTIVE PULMONARY DISEASE, UNSPECIFIED COPD TYPE: ICD-10-CM

## 2023-03-16 PROCEDURE — G0439 PPPS, SUBSEQ VISIT: HCPCS | Performed by: FAMILY MEDICINE

## 2023-03-16 PROCEDURE — 1170F FXNL STATUS ASSESSED: CPT | Performed by: FAMILY MEDICINE

## 2023-03-16 PROCEDURE — 3046F HEMOGLOBIN A1C LEVEL >9.0%: CPT | Performed by: FAMILY MEDICINE

## 2023-03-16 PROCEDURE — 3079F DIAST BP 80-89 MM HG: CPT | Performed by: FAMILY MEDICINE

## 2023-03-16 PROCEDURE — 3075F SYST BP GE 130 - 139MM HG: CPT | Performed by: FAMILY MEDICINE

## 2023-03-16 PROCEDURE — 99213 OFFICE O/P EST LOW 20 MIN: CPT | Performed by: FAMILY MEDICINE

## 2023-03-16 RX ORDER — LISINOPRIL 10 MG/1
10 TABLET ORAL EVERY MORNING
Qty: 90 TABLET | Refills: 1 | Status: SHIPPED | OUTPATIENT
Start: 2023-03-16

## 2023-03-16 RX ORDER — ZOSTER VACCINE RECOMBINANT, ADJUVANTED 50 MCG/0.5
0.5 KIT INTRAMUSCULAR ONCE
Qty: 1 EACH | Refills: 1 | Status: SHIPPED | OUTPATIENT
Start: 2023-03-16 | End: 2023-03-16

## 2023-03-16 NOTE — PATIENT INSTRUCTIONS
"Medicare/insurances offer certain visits called \"wellness/annual\" that allows for time to deal with and  review the many aspects of \"being well\" that just might not get mentioned during other visits with your doctor through the year.  This includes things like reviews of health screenings (mammograms, various labs),  weight, exercise, vaccines for just a few examples.      In order to help you with this we wish to make you aware of a few things for you to consider:    1. Advanced directives.  These are documents used to help direct your care if your health/situation should reach a point that you cannot make your own decisions.  While it is likely you do not currently have a need for these documents now; it is something that we all might face at any time.   The hand outs you are being given today are simply for you to review and use to learn more about these documents and consider them as you wish.      2. Vaccines: Certain vaccines are important after age 50, 60, and 65 and some health situations (for example COPD), require even boosters beyond age 65.  We are happy to review with you your vaccine status and vaccines that might be needed for you at this point:      a. Tetanus.   Like anyone this needs to given every 10 years; sooner for/with lacerations/wounds.   Likely when getting this booster it needs to be a tetanus called Tdap (tetanus mixed with diptheria and pertussis).   Years ago you had this vaccine.  We now know we can lose our immunity to pertussis (a part of this vaccine) and run a risk of catching this.  Now only would this make us ill; but more importantly we can spread this to very young children (and for them it can be a much more dangerous illness).   We call this the grandparent vaccine for this reason.     b. Pneumonia (strept).   This comes now with three brands.   Previously it was recommended to take prevnar and a year later pneumovax 23.  Now pneumonia 20 is replacing these with a one time " pneumonia vaccine.   Even if you have had these before; we need to review when and your current health situation/s as you may need boosters and even recently the CDC has made recent/new recommendations for pneumovax.      c. Shingles.  You do not want to catch shingles.  Though you will recover from this; the pain associated with shingles can be severe.  Even if you have had the now older zostavax, or have had shingles; it is recommended you still get the Shingrix (the new vaccine just available early 2018 shingles vaccine).  A new shingles vaccine (a shot to lower your chance of catching shingles) is now available (shingrix).  This vaccine is the second vaccine created for this purpose; (we have had zostavax for years).  Shingrix provides a much better and longer immunity for shingles than zostavax.  For this and other reasons Shingrix can be started at age 50.  If you have had zostavax in the past; you can still take Shingrix.  Beginning 2023 medicare no longer requires a co-pay for this (ie: it is free)    This vaccine is not paid for in a doctor's office by medicare, medicaid and probably most insurances.  Like zostavax; this is covered in drug stores.  This is a vaccine that if you chose to get you need to get at a drug store that gives vaccines (like [facility], [facility] and [facility].      d. Yearly flu vaccine given from September through April each year (there is a special vaccine for those over 65).     e. Travel vaccines:  If you are one to do international travel; be sure and ask us for any particular unusual vaccines you may need.     f.  Miscellaneous:  If you have certain health situations/disease you may need specific/particular vaccines not give to the general public.     g.  Covid: currently recommended everyone over 6m  The brands Pfizer/Moderna are for 3 total shots as immunity will wane from less than this.  [employer] has a version that comes with recommendations for an  initial vaccine and booster after.  I no longer recommend J&J as a first choice as Pfizer/Moderna are readily available.  If you have had an initial J&J I recommend you booster with Moderna.   I strongly recommend covid vaccination; being unvaccinated or partially vaccinated carries real risk for disease and even death.     Because of many restrictions on this office always having all the above vaccines; you may be advised to work with your local health department to keep up with your individual vaccine needs.    The vaccines we have on record for you include:   Immunization History   Administered Date(s) Administered    COVID-19 (MODERNA) 1st, 2nd, 3rd Dose Only 03/10/2021, 04/10/2021    COVID-19 (MODERNA) BOOSTER 10/29/2021    Fluzone High-Dose 65+yrs 09/29/2021    INFLUENZA SPLIT TRI 10/13/2015    Pneumococcal Polysaccharide (PPSV23) 01/04/2021       If you have record of other vaccines from vaccine clinics, [facility], [facility] and like and want them to show in your chart here; please talk to our nurses about having your vaccine record updated. We would be very pleased if you would take the time to get us this information to keep you main health record here current.     3. Exercise: regular cardio exercise something everyone should consider and try to do; even if health limitations (ie find that exercise UE/LE/cardio that they can tolerate).   Normal weight a goal for everyone (as we discussed)    4. Healthy diet helpful for weight management, illness prevention.     5. If over 50-screening exams include men PSA/rectal exam, women mammograms, and everyone colonoscopy screening for colon cancer.    6. If you use tobacco of any kind or e-products you should stop. We are providing you some information to consider that could make this process easier.      ##################################

## 2023-03-16 NOTE — PROGRESS NOTES
The ABCs of the Annual Wellness Visit  Subsequent Medicare Wellness Visit    Subjective    Nanci Benitez is a 67 y.o. female who presents for a Subsequent Medicare Wellness Visit.    The following portions of the patient's history were reviewed and   updated as appropriate: allergies, current medications, past family history, past medical history, past social history, past surgical history and problem list.    Compared to one year ago, the patient feels her physical   health is the same.    Compared to one year ago, the patient feels her mental   health is the same.    Recent Hospitalizations:  She was not admitted to the hospital during the last year.       Current Medical Providers:  Patient Care Team:  Carlitos Ortiz MD as PCP - General (Family Medicine)    Outpatient Medications Prior to Visit   Medication Sig Dispense Refill   • aspirin 81 MG EC tablet Take 1 tablet by mouth Daily.     • atorvastatin (LIPITOR) 80 MG tablet Take 1 tablet by mouth Every Night. 90 tablet 0   • cholecalciferol (Vitamin D, Cholecalciferol,) 25 MCG (1000 UT) tablet Take 1 tablet by mouth 2 (Two) Times a Day. 180 tablet 1   • Cyanocobalamin (B-12) 1000 MCG tablet Take  by mouth. Twice weekly     • Insulin Glargine (BASAGLAR KWIKPEN) 100 UNIT/ML injection pen Inject 30 Units under the skin into the appropriate area as directed Daily. 27 mL 1   • linagliptin (TRADJENTA) 5 MG tablet tablet Take 1 tablet by mouth Every Morning. 90 tablet 0   • lisinopril (PRINIVIL,ZESTRIL) 10 MG tablet Take 1 tablet by mouth Every Morning. 90 tablet 1   • metFORMIN ER (GLUCOPHAGE-XR) 750 MG 24 hr tablet Take 1 tablet by mouth 2 (Two) Times a Day. 180 tablet 1   • Thiamine HCl (VITAMIN B1 PO) Take 1 tablet by mouth Daily.     • venlafaxine XR (EFFEXOR-XR) 75 MG 24 hr capsule Take 1 capsule by mouth Every Night. 90 capsule 0   • lisinopril (PRINIVIL,ZESTRIL) 10 MG tablet Take 1 tablet by mouth Every Morning.     • vitamin D (ERGOCALCIFEROL) 1.25 MG  "(24010 UT) capsule capsule Take 1 capsule by mouth 1 (One) Time Per Week. 5 capsule 0     No facility-administered medications prior to visit.       No opioid medication identified on active medication list. I have reviewed chart for other potential  high risk medication/s and harmful drug interactions in the elderly.          Aspirin is on active medication list. Aspirin use is indicated based on review of current medical condition/s. Pros and cons of this therapy have been discussed today. Benefits of this medication outweigh potential harm.  Patient has been encouraged to continue taking this medication.  .      Patient Active Problem List   Diagnosis   • Tremor of both hands   • B12 deficiency-oral   • Atherosclerosis: carotid   • COPD (chronic obstructive pulmonary disease) (HCC)   • Essential hypertension   • Hyperlipemia-statin   • Migraine   • Mixed anxiety depressive disorder   • Degenerative joint disease   • Status post CVA-2021   • Type 2 diabetes with complication (HCC)   • Vitamin D deficiency   • Tobacco user: 2.2023/12m   • Wellness examination   • Anticoagulated: CVA/ASA 81   • Leg edema   • Insomnia     Advance Care Planning  Advance Directive is not on file.  ACP discussion was held with the patient during this visit. Patient has an advance directive (not in EMR), copy requested.     Objective    Vitals:    03/16/23 1420   BP: 130/82   Pulse: 79   Resp: 14   SpO2: 98%   Weight: 70.8 kg (156 lb)   Height: 165.1 cm (65\")     Estimated body mass index is 25.96 kg/m² as calculated from the following:    Height as of this encounter: 165.1 cm (65\").    Weight as of this encounter: 70.8 kg (156 lb).    BMI is >= 25 and <30. (Overweight) The following options were offered after discussion;: exercise counseling/recommendations and nutrition counseling/recommendations      Does the patient have evidence of cognitive impairment? No    Lab Results   Component Value Date    CHLPL 363 (H) 01/16/2023    TRIG 506 " (H) 01/16/2023    HDL 47 01/16/2023     (H) 01/16/2023    VLDL 109 (H) 01/16/2023    HGBA1C 9.30 (H) 01/16/2023        HEALTH RISK ASSESSMENT    Smoking Status:  Social History     Tobacco Use   Smoking Status Every Day   • Packs/day: 2.00   • Years: 1.00   • Pack years: 2.00   • Types: Cigarettes   • Start date: 12/31/1969   Smokeless Tobacco Never     Alcohol Consumption:  Social History     Substance and Sexual Activity   Alcohol Use Yes    Comment: mixed drink once a month     Fall Risk Screen:    STEADI Fall Risk Assessment was completed, and patient is at MODERATE risk for falls. Assessment completed on:1/16/2023    Depression Screening:  PHQ-2/PHQ-9 Depression Screening 3/16/2023   Little Interest or Pleasure in Doing Things 0-->not at all   Feeling Down, Depressed or Hopeless 1-->several days   PHQ-9: Brief Depression Severity Measure Score 1       Health Habits and Functional and Cognitive Screening:  Functional & Cognitive Status 3/16/2023   Do you have difficulty preparing food and eating? No   Do you have difficulty bathing yourself, getting dressed or grooming yourself? No   Do you have difficulty using the toilet? No   Do you have difficulty moving around from place to place? No   Do you have trouble with steps or getting out of a bed or a chair? No   Current Diet Well Balanced Diet   Dental Exam Not up to date   Eye Exam Not up to date   Exercise (times per week) 0 times per week   Current Exercises Include No Regular Exercise   Do you need help using the phone?  No   Are you deaf or do you have serious difficulty hearing?  No   Do you need help with transportation? No   Do you need help shopping? No   Do you need help preparing meals?  No   Do you need help with housework?  No   Do you need help with laundry? No   Do you need help taking your medications? No   Do you need help managing money? No   Do you ever drive or ride in a car without wearing a seat belt? No   Have you felt unusual  stress, anger or loneliness in the last month? No   Who do you live with? Child   If you need help, do you have trouble finding someone available to you? No   Have you been bothered in the last four weeks by sexual problems? No   Do you have difficulty concentrating, remembering or making decisions? No       Age-appropriate Screening Schedule:  Refer to the list below for future screening recommendations based on patient's age, sex and/or medical conditions. Orders for these recommended tests are listed in the plan section. The patient has been provided with a written plan.    Health Maintenance   Topic Date Due   • MAMMOGRAM  Never done   • URINE MICROALBUMIN  Never done   • DXA SCAN  Never done   • COLORECTAL CANCER SCREENING  Never done   • TDAP/TD VACCINES (1 - Tdap) Never done   • ZOSTER VACCINE (1 of 2) Never done   • COVID-19 Vaccine (4 - Booster for Moderna series) 12/24/2021   • Pneumococcal Vaccine 65+ (2 - PCV) 01/04/2022   • INFLUENZA VACCINE  08/01/2022   • HEPATITIS C SCREENING  Never done   • ANNUAL WELLNESS VISIT  Never done   • DIABETIC FOOT EXAM  Never done   • DIABETIC EYE EXAM  Never done   • HEMOGLOBIN A1C  07/16/2023   • LIPID PANEL  01/16/2024                CMS Preventative Services Quick Reference  Risk Factors Identified During Encounter  Immunizations Discussed/Encouraged: Shingrix  The above risks/problems have been discussed with the patient.  Pertinent information has been shared with the patient in the After Visit Summary.  An After Visit Summary and PPPS were made available to the patient.    Follow Up:   Next Medicare Wellness visit to be scheduled in 1 year.       Additional E&M Note during same encounter follows:  Patient has multiple medical problems which are significant and separately identifiable that require additional work above and beyond the Medicare Wellness Visit.        E/M encounter  Subjective   Nanci Benitez is a 67 y.o. female presenting with chief complaint of:    Chief Complaint   Patient presents with   • Follow-up     2 mo f/u   • Medicare Wellness-subsequent     AWV never here    History of Present Illness :  Alone.   Here for review of chronic problems that includes atherosclerosis and others.  Also yearly medicare wellness exam.    Has multiple chronic problems to consider that might have a bearing on today's issues;  an interval appointment.       Chronic/acute problems reviewed today:   1. Wellness examination Chronic ongoing over time screening or advice for general health care/wellness.      2. Osteoarthritis, unspecified osteoarthritis type, unspecified site Chronic/stable.  Various on/off joint pains/soreness/stiffness.  Particular joint problems with many.  No joint swelling.  Treats mainly with reduced activity, Rx listed, Tylenol.  No  NSAIDs, and no recent injections.      3. Vitamin D deficiency chronic/variable up/down with past labs and/or risk to run low especially in winter. Lab monitored.      4. Type 2 diabetes with complication (HCC) see elevated blood sugar   5. Anticoagulated: CVA/ASA 81 Chronic/stable reason for stopping or use of.  Denies bleeding issues; especially epistaxis, melena, hematochezia.  Upper arms/others do not significantly bruise easily.  No significant bleeding or falls.      6. Mixed hyperlipidemia Chronic/stable.  Tolerated use of Rx with labs showing improved lipid values and tolerant liver labs. No muscle aches unexpected.      7. Atherosclerosis: carotid chronic/stable various areas of disease.  Sees no  vascular regularly and no plans for upcoming interventions.  Denies development/change in chest pain, claudication, CVA-TIA symptoms such as (Manifest by slurred speech asymmetry of face dysfunction/weakness of extremities).  Blood thinners noted.      8. Essential hypertension Chronic/stable. Stable here past/no recent home blood pressures.  No significant chest pain, SOB, LE edema, orthopnea, near syncope, dizziness/light  headness.   Recent Vitals       1/16/2023 2/23/2023 3/16/2023       BP: 132/98 128/78 130/82     Pulse: -- 82 79     Weight: -- 68 kg (150 lb) 70.8 kg (156 lb)     BMI (Calculated): -- 25 26            9. Chronic obstructive pulmonary disease, unspecified COPD type (HCC) Chronic/stable mild occ cough, sob, wheeze.  Rx helps.   Still smoking.       10. Tobacco user: 2.2023/12m Chronic/stable. Has been advised before/here to stop smoking and giving advice of available resources to help with that.  Has never any length of time stopped before.      11. Uncontrolled type 2 diabetes mellitus with hyperglycemia (HCC) Chronic/variable . No problem/pattern hypoglycemia/hyperglycemia manifest by poly- dypsia, phagia, uria, or sweats, diaphoretic episodes, syncope/near.     12. Encounter for screening mammogram for breast cancer Chronic/ongoing yearly need to review for breast cancer.  No breast pain, masses, discharge.       13. Menopause Chronic/stable.  Last bone density/if below.   Has considered Rx.  Ok further bone density screening when due.      14. Vaccine counseling Chronic/ongoing need to review pro/cons for various vaccinations based on age, health issues.  Needs vaccination today for: see below.       Has an/another acute issue today: none.    The following portions of the patient's history were reviewed and updated as appropriate: allergies, current medications, past family history, past medical history, past social history, past surgical history and problem list.      Current Outpatient Medications:   •  aspirin 81 MG EC tablet, Take 1 tablet by mouth Daily., Disp: , Rfl:   •  atorvastatin (LIPITOR) 80 MG tablet, Take 1 tablet by mouth Every Night., Disp: 90 tablet, Rfl: 0  •  cholecalciferol (Vitamin D, Cholecalciferol,) 25 MCG (1000 UT) tablet, Take 1 tablet by mouth 2 (Two) Times a Day., Disp: 180 tablet, Rfl: 1  •  Cyanocobalamin (B-12) 1000 MCG tablet, Take  by mouth. Twice weekly, Disp: , Rfl:   •   Insulin Glargine (BASAGLAR KWIKPEN) 100 UNIT/ML injection pen, Inject 30 Units under the skin into the appropriate area as directed Daily., Disp: 27 mL, Rfl: 1  •  linagliptin (TRADJENTA) 5 MG tablet tablet, Take 1 tablet by mouth Every Morning., Disp: 90 tablet, Rfl: 0  •  lisinopril (PRINIVIL,ZESTRIL) 10 MG tablet, Take 1 tablet by mouth Every Morning., Disp: 90 tablet, Rfl: 1  •  metFORMIN ER (GLUCOPHAGE-XR) 750 MG 24 hr tablet, Take 1 tablet by mouth 2 (Two) Times a Day., Disp: 180 tablet, Rfl: 1  •  Thiamine HCl (VITAMIN B1 PO), Take 1 tablet by mouth Daily., Disp: , Rfl:   •  venlafaxine XR (EFFEXOR-XR) 75 MG 24 hr capsule, Take 1 capsule by mouth Every Night., Disp: 90 capsule, Rfl: 0    Current Outpatient Medications for DM  •  Insulin Glargine (BASAGLAR KWIKPEN) 100 UNIT/ML injection pen, Inject 30 Units under the skin into the appropriate area as directed Daily., Disp: 27 mL, Rfl: 1  •  linagliptin (TRADJENTA) 5 MG tablet tablet, Take 1 tablet by mouth Every Morning., Disp: 90 tablet, Rfl: 0  •  metFORMIN ER (GLUCOPHAGE-XR) 750 MG 24 hr tablet, Take 1 tablet by mouth 2 (Two) Times a Day., Disp: 180 tablet, Rfl: 1      No problems with medications.    Allergies   Allergen Reactions   • Meloxicam Other (See Comments) and Palpitations   • Metformin Diarrhea     Cant take the IR, can take XR or ER       Review of Systems  GENERAL:  Well nourished/developed in no acute distress.  BS  range.   SKIN: Turgor ok without wound, rash, lesion  HEENT: Normal cephalic without trauma.  Pupils equal round reactive to light. Extraocular motions full without nystagmus.   External canals nonobstructive nontender without reddness. Tymphatic membranes jeevan with delfin structures intact.   Oral cavity without growths, exudates, and moist.  Posterior pharynx without mass, obstruction; mild/diffuse redness.  No thyromegaly, mass, tenderness, lymphadenopathy and supple.  CV: Regular rhythm.  No murmur, gallop; 1/4 ankle  equal LE/pitting edema. Posterior pulses intact.  No carotid bruits.  CHEST: No chest wall tenderness or mass.   LUNGS: Symmetric motion with clear to auscultation.  No dullness to percussion  ABD: Soft, nontender without mass.   ORTHO: Symmetric extremities without swelling/point tenderness (L index finger distal phalynx surgically gone).  Full gross range of motion.  NEURO: CN 2-12 grossly intact.  Symmetric facies and UE/LE. 2-3/5 strength throughout. 1/4 x bicep knee equal reflexes.  Nonfocal use extremities. Speech clear.  Intact light touch with monofilament, vibratory sensation with tuning fork; equal toes/distal feet.    PSYCH: Oriented x 3.  Pleasant calm, well kept.  Purposeful/directed conservation with intact short/long gross memory.   Screening:  Gyne: years  Mammogram: ?/discussed  Bone density: ?/discussed  Low dose CT chest: Tobacco-smoker/age 16/1ppd  CT Chest Low Dose Cancer Screening WO  Result Date: 2/14/2023   1.  5 mm pulmonary nodule in the LEFT upper lobe and within the RIGHT upper lobe.  2.  Moderate coronary artery atherosclerotic calcification.  Lung-RADS 2: Benign Nodules with a very low likelihood of becoming a clinically active cancer due to size or lack of growth. Continue annual screening with low dose CT in 12 months.  GI: none/encouraged  Prostate: NA  Usual lab order  To establish     Copy/paste function used for ROS/exam AND each area of these were reviewed, updated, confirmed and supplemented as needed.  Data reviewed:   Recent admit/ER/MD visits: 1.16.23    1. Encounter to establish care    2. Atherosclerosis: carotid    3. Essential hypertension    4. Mixed hyperlipidemia    5. Type 2 diabetes with complication (HCC)    6. Vitamin D deficiency    7. Mixed anxiety depressive disorder    8. Anticoagulated: CVA/ASA 81    9. Osteoarthritis, unspecified osteoarthritis type, unspecified site    10. Chronic obstructive pulmonary disease, unspecified COPD type (HCC)    11. Tremor of  both hands    12. Status post CVA-2021    13. Leg edema    14. Tobacco user          Data review above:   Discussions/medical decisions/reviews:  BP too high  Other vitals second pulse 70; regular  Labs due     Labs today; then decisions on edema, tremor/Rx, DM status and others  All screening due  Needs vascular minimum     Data review above:   Rx: reviewed and decisions:   Rx new/changes:   No orders of the defined types were placed in this encounter.     Orders placed:   LAB/Testing/Referrals: reviewed/orders:   Today:       Orders Placed This Encounter   Procedures   • Comprehensive metabolic panel   • Lipid Panel With LDL/HDL Ratio   • TSH   • Hemoglobin A1c   • Vitamin D,25-Hydroxy   • Ambulatory Referral to Vascular Surgery   • CBC and Differential     Last cardiac testing:   Echo: Results for orders placed during the hospital encounter of 02/14/23    Adult Transthoracic Echo Complete W/ Cont if Necessary Per Protocol    Interpretation Summary  •  Left ventricular systolic function is normal. Calculated left ventricular EF = 65%  •  Left ventricular diastolic function is consistent with age.  •  Normal right ventricular cavity size and systolic function noted.  •  There is no significant (greater than mild) valvular dysfunction.    Radiology considered:   US Carotid Bilateral    Result Date: 2/23/2023  Impression: 1. There is known occlusion of the right internal carotid artery. 2. There is less than 50% stenosis of the left internal carotid artery. 3. Antegrade flow is demonstrated in bilateral vertebral arteries.  Comments: Bilateral carotid vertebral arterial duplex scan was performed.  There is a known occlusion of the right internal carotid artery.  Grayscale imaging shows intimal thickening and calcified elements at the carotid bifurcation. The left internal carotid artery peak systolic velocity is 94.6 cm/sec. The end-diastolic velocity is 28.4 cm/sec. The left ICA/CCA ratio is approximately 1.4 .  These findings correlate with less than 50% stenosis of the left internal carotid artery.  Antegrade flow is demonstrated in bilateral vertebral arteries.  This report was finalized on 02/23/2023 16:44 by Dr. Tito Moore MD.     CT Chest Low Dose Cancer Screening WO    Result Date: 2/14/2023   1.  5 mm pulmonary nodule in the LEFT upper lobe and within the RIGHT upper lobe.  2.  Moderate coronary artery atherosclerotic calcification.  Lung-RADS 2: Benign Nodules with a very low likelihood of becoming a clinically active cancer due to size or lack of growth. Continue annual screening with low dose CT in 12 months. This report was finalized on 02/14/2023 14:12 by Dr. See Flowers MD.   '  Lab Results:  Results for orders placed or performed during the hospital encounter of 02/14/23       A1C:  Lab Results - Last 18 Months   Lab Units 01/16/23  0829   HEMOGLOBIN A1C % 9.30*     GLUCOSE:  Lab Results - Last 18 Months   Lab Units 01/16/23  0829   GLUCOSE mg/dL 234*     LIPID:  Lab Results - Last 18 Months   Lab Units 01/16/23  0829   CHOLESTEROL mg/dL 363*   LDL CHOL mg/dL 207*   HDL CHOL mg/dL 47   TRIGLYCERIDES mg/dL 506*     PSA:No results found for: PSA    CBC:  Lab Results - Last 18 Months   Lab Units 01/16/23  0829   WBC 10*3/mm3 12.17*   HEMOGLOBIN g/dL 15.1   HEMATOCRIT % 45.6   PLATELETS 10*3/mm3 266      BMP/CMP:  Lab Results - Last 18 Months   Lab Units 01/16/23  0829   SODIUM mmol/L 139   POTASSIUM mmol/L 3.9   CHLORIDE mmol/L 99   TOTAL CO2 mmol/L 27.4   GLUCOSE mg/dL 234*   BUN mg/dL 10   CREATININE mg/dL 0.80   EGFR RESULT mL/min/1.73 80.9   CALCIUM mg/dL 9.8     HEPATIC:  Lab Results - Last 18 Months   Lab Units 01/16/23  0829   ALT (SGPT) U/L 21   AST (SGOT) U/L 15   ALK PHOS U/L 121*     Vit D:  Lab Results - Last 18 Months   Lab Units 01/16/23  0829   VIT D 25 HYDROXY ng/ml 23.4*     THYROID:  Lab Results - Last 18 Months   Lab Units 01/16/23  0829   TSH uIU/mL 3.600       Objective   /82   " Pulse 79   Resp 14   Ht 165.1 cm (65\")   Wt 70.8 kg (156 lb)   SpO2 98%   BMI 25.96 kg/m²       Recent Vitals       1/16/2023 2/23/2023 3/16/2023       BP: 132/98 128/78 --     Pulse: -- 82 --     Weight: -- 68 kg (150 lb) 70.8 kg (156 lb)     BMI (Calculated): -- 25 26         Wt Readings from Last 15 Encounters:   03/16/23 1420 70.8 kg (156 lb)   02/23/23 1008 68 kg (150 lb)   01/16/23 0818 68.2 kg (150 lb 6.4 oz)       Physical Exam  GENERAL:  Well nourished/developed in no acute distress.   SKIN: Turgor ok without wound, rash, lesion  HEENT: Normal cephalic without trauma.  Pupils equal round reactive to light. Extraocular motions full without nystagmus.   External canals nonobstructive nontender without reddness. Tymphatic membranes jeevan with delfin structures intact.   Oral cavity without growths, exudates, and moist.  Posterior pharynx without mass, obstruction; mild/diffuse redness.  No thyromegaly, mass, tenderness, lymphadenopathy and supple.  CV: Regular rhythm.  No murmur, gallop; 1/4 ankle equal LE/pitting edema. Posterior pulses intact.  No carotid bruits.  CHEST: No chest wall tenderness or mass.   LUNGS: Symmetric motion with clear/decreased to auscultation.  No dullness to percussion  ABD: Soft, nontender without mass.   ORTHO: Symmetric extremities without swelling/point tenderness (L index finger distal phalynx surgically gone).  Full gross range of motion.  NEURO: CN 2-12 grossly intact.  Symmetric facies and UE/LE. 2-3/5 strength throughout. 1/4 x bicep knee equal reflexes.  Nonfocal use extremities. Speech clear.  Intact light touch with monofilament, vibratory sensation with tuning fork; equal toes/distal feet.    PSYCH: Oriented x 3.  Pleasant calm, well kept.  Purposeful/directed conservation with intact short/long gross memory.      Assessment & Plan     1. Wellness examination    2. Osteoarthritis, unspecified osteoarthritis type, unspecified site    3. Vitamin D deficiency    4. " Type 2 diabetes with complication (HCC)    5. Anticoagulated: CVA/ASA 81    6. Mixed hyperlipidemia    7. Atherosclerosis: carotid    8. Essential hypertension    9. Chronic obstructive pulmonary disease, unspecified COPD type (HCC)    10. Tobacco user: 2.2023/12m    11. Uncontrolled type 2 diabetes mellitus with hyperglycemia (HCC)    12. Encounter for screening mammogram for breast cancer    13. Menopause    14. Vaccine counseling        Issues that are new, uncontrolled, or required review HPI/ROS/exam and decisions beyond wellness today: (ie: requiring the service of a physician and/or not likely to resolve independently without clinical intervention.)   BS not controlled   Vit D was low  Lung nodule; has to be repeated    Discussions/medical decisions/reviews:  BP ok  Other vitals ok  DM/BS 9.3 1.16.23  Lipid  1.16.23; lipitor 80  PSA NA  CBC ok 1.16.23  Renal ok 1.16.23  Liver alk p 121 1.16.23  Vit D 23 1.16.23  Thyroid TSH 1.16.23    Wellness/or annual done   Screening reviewed/updated   Vaccines discussed shingles shot free  Vit D treatment; finish 50k then stay on 1000 bid (we will nikole)  12m nikole lung nodule  BS 35 bazaglar and add jardiance  Emphasis AM BS before you eat-call if any lows.     Data review above:   Rx: reviewed and decisions:   Rx new/changes:   New Medications Ordered This Visit   Medications   • empagliflozin (Jardiance) 10 MG tablet tablet     Sig: Take 1 tablet by mouth Daily.     Dispense:  30 tablet     Refill:  5   • Zoster Vac Recomb Adjuvanted (Shingrix) 50 MCG/0.5ML reconstituted suspension     Sig: Inject 0.5 mL into the appropriate muscle as directed by prescriber 1 (One) Time for 1 dose.     Dispense:  1 each     Refill:  1     Orders placed:   LAB/Testing/Referrals: reviewed/orders:   Today:   Orders Placed This Encounter   Procedures   • DEXA Bone Density Axial   • Mammo Screening Digital Tomosynthesis Bilateral With CAD     Chronic/recurrent labs above or change to:  "  Same   Immunization History   Administered Date(s) Administered   • COVID-19 (MODERNA) 1st, 2nd, 3rd Dose Only 03/10/2021, 04/10/2021   • COVID-19 (MODERNA) BOOSTER 10/29/2021   • Fluzone High-Dose 65+yrs 09/29/2021   • INFLUENZA SPLIT TRI 10/13/2015   • Pneumococcal Polysaccharide (PPSV23) 01/04/2021     We advised/reaffirmed our support/suggestion for staying complete with covid- covid boosters, seasonal flu/yearly and any missing vaccine from list we supplied; we suggest contact with local health department office to review missing/needed vaccines and then bring nursing documentation for these vaccines to this office or call this information in. Shingles became \"free\" 1.1.23.      Health maintenance:     Tobacco use reviewed:   Nanci Benitez  reports that she has been smoking cigarettes. She started smoking about 53 years ago. She has a 2.00 pack-year smoking history. She has never used smokeless tobacco.. I have educated her on the risk of diseases from using tobacco products such as cancer, COPD and heart disease.     I advised her to quit and she is not willing to quit.    I spent 1 minutes counseling the patient.  Reminded; as discussed before.     Annual/wellness also done today.  Issues as appropriate discussed as counseling, anticipatory guidance:   Nutrition, physical activity, healthy weight, injury prevention, misuse of tobacco, alcohol and drugs, sexual behavior and STDs, contraception, dental health, mental health, immunizations, screenings as appropriate. As appropriate see AVS.         Assessment and Plan   Diagnoses and all orders for this visit:    1. Wellness examination    2. Osteoarthritis, unspecified osteoarthritis type, unspecified site    3. Vitamin D deficiency    4. Type 2 diabetes with complication (HCC)    5. Anticoagulated: CVA/ASA 81    6. Mixed hyperlipidemia    7. Atherosclerosis: carotid    8. Essential hypertension    9. Chronic obstructive pulmonary disease, unspecified COPD " "type (HCC)    10. Tobacco user: 2.2023/12m    11. Uncontrolled type 2 diabetes mellitus with hyperglycemia (HCC)  -     empagliflozin (Jardiance) 10 MG tablet tablet; Take 1 tablet by mouth Daily.  Dispense: 30 tablet; Refill: 5    12. Encounter for screening mammogram for breast cancer  -     Mammo Screening Digital Tomosynthesis Bilateral With CAD; Future    13. Menopause  -     DEXA Bone Density Axial; Future    14. Vaccine counseling  -     Zoster Vac Recomb Adjuvanted (Shingrix) 50 MCG/0.5ML reconstituted suspension; Inject 0.5 mL into the appropriate muscle as directed by prescriber 1 (One) Time for 1 dose.  Dispense: 1 each; Refill: 1           Patient Instructions     Medicare/insurances offer certain visits called \"wellness/annual\" that allows for time to deal with and  review the many aspects of \"being well\" that just might not get mentioned during other visits with your doctor through the year.  This includes things like reviews of health screenings (mammograms, various labs),  weight, exercise, vaccines for just a few examples.      In order to help you with this we wish to make you aware of a few things for you to consider:    1. Advanced directives.  These are documents used to help direct your care if your health/situation should reach a point that you cannot make your own decisions.  While it is likely you do not currently have a need for these documents now; it is something that we all might face at any time.   The hand outs you are being given today are simply for you to review and use to learn more about these documents and consider them as you wish.      2. Vaccines: Certain vaccines are important after age 50, 60, and 65 and some health situations (for example COPD), require even boosters beyond age 65.  We are happy to review with you your vaccine status and vaccines that might be needed for you at this point:      a. Tetanus.   Like anyone this needs to given every 10 years; sooner for/with " lacerations/wounds.   Likely when getting this booster it needs to be a tetanus called Tdap (tetanus mixed with diptheria and pertussis).   Years ago you had this vaccine.  We now know we can lose our immunity to pertussis (a part of this vaccine) and run a risk of catching this.  Now only would this make us ill; but more importantly we can spread this to very young children (and for them it can be a much more dangerous illness).   We call this the grandparent vaccine for this reason.     b. Pneumonia (strept).   This comes now with three brands.   Previously it was recommended to take prevnar and a year later pneumovax 23.  Now pneumonia 20 is replacing these with a one time pneumonia vaccine.   Even if you have had these before; we need to review when and your current health situation/s as you may need boosters and even recently the CDC has made recent/new recommendations for pneumovax.      c. Shingles.  You do not want to catch shingles.  Though you will recover from this; the pain associated with shingles can be severe.  Even if you have had the now older zostavax, or have had shingles; it is recommended you still get the Shingrix (the new vaccine just available early 2018 shingles vaccine).  A new shingles vaccine (a shot to lower your chance of catching shingles) is now available (shingrix).  This vaccine is the second vaccine created for this purpose; (we have had zostavax for years).  Shingrix provides a much better and longer immunity for shingles than zostavax.  For this and other reasons Shingrix can be started at age 50.  If you have had zostavax in the past; you can still take Shingrix.  Beginning 2023 medicare no longer requires a co-pay for this (ie: it is free)    This vaccine is not paid for in a doctor's office by medicare, medicaid and probably most insurances.  Like zostavax; this is covered in drug stores.  This is a vaccine that if you chose to get you need to get at a drug store that gives  vaccines (like [facility], [facility] and [facility].      d. Yearly flu vaccine given from September through April each year (there is a special vaccine for those over 65).     e. Travel vaccines:  If you are one to do international travel; be sure and ask us for any particular unusual vaccines you may need.     f.  Miscellaneous:  If you have certain health situations/disease you may need specific/particular vaccines not give to the general public.     g.  Covid: currently recommended everyone over 6m  The brands Pfizer/Moderna are for 3 total shots as immunity will wane from less than this.  Phoodeez/Arian has a version that comes with recommendations for an initial vaccine and booster after.  I no longer recommend J&J as a first choice as Pfizer/Moderna are readily available.  If you have had an initial J&J I recommend you booster with Moderna.   I strongly recommend covid vaccination; being unvaccinated or partially vaccinated carries real risk for disease and even death.     Because of many restrictions on this office always having all the above vaccines; you may be advised to work with your local health department to keep up with your individual vaccine needs.    The vaccines we have on record for you include:   Immunization History   Administered Date(s) Administered   • COVID-19 (MODERNA) 1st, 2nd, 3rd Dose Only 03/10/2021, 04/10/2021   • COVID-19 (MODERNA) BOOSTER 10/29/2021   • Fluzone High-Dose 65+yrs 09/29/2021   • INFLUENZA SPLIT TRI 10/13/2015   • Pneumococcal Polysaccharide (PPSV23) 01/04/2021       If you have record of other vaccines from vaccine clinics, [facility], [facility] and like and want them to show in your chart here; please talk to our nurses about having your vaccine record updated. We would be very pleased if you would take the time to get us this information to keep you main health record here current.     3. Exercise: regular cardio exercise something everyone should consider  and try to do; even if health limitations (ie find that exercise UE/LE/cardio that they can tolerate).   Normal weight a goal for everyone (as we discussed)    4. Healthy diet helpful for weight management, illness prevention.     5. If over 50-screening exams include men PSA/rectal exam, women mammograms, and everyone colonoscopy screening for colon cancer.    6. If you use tobacco of any kind or e-products you should stop. We are providing you some information to consider that could make this process easier.      ##################################              Follow up: Return for lab/Dr Ortiz 6m.  Future Appointments   Date Time Provider Department Center   4/11/2023  9:00 AM Can Cespedes MD [facility] PAD PAD   9/12/2023  9:35 AM [facility] PAD   9/19/2023  1:45 PM Carlitos Ortiz MD [facility] METR PAD   2/5/2024  9:00 AM [facility] US [facility] CART 2 [facility] PAD US PAD   2/5/2024 10:00 AM PAD US 1 BH PAD US PAD   2/5/2024 11:45 AM Jennifer Blackwood APRN [facility] VS PAD PAD

## 2023-04-04 ENCOUNTER — HOSPITAL ENCOUNTER (OUTPATIENT)
Dept: MAMMOGRAPHY | Facility: HOSPITAL | Age: 68
Discharge: HOME | End: 2023-04-04
Payer: MEDICARE

## 2023-04-04 ENCOUNTER — HOSPITAL ENCOUNTER (OUTPATIENT)
Dept: BONE DENSITY | Facility: HOSPITAL | Age: 68
Discharge: HOME | End: 2023-04-04
Payer: MEDICARE

## 2023-04-04 DIAGNOSIS — Z78.0 MENOPAUSE: ICD-10-CM

## 2023-04-04 DIAGNOSIS — Z12.31 ENCOUNTER FOR SCREENING MAMMOGRAM FOR BREAST CANCER: ICD-10-CM

## 2023-04-04 PROCEDURE — 77063 BREAST TOMOSYNTHESIS BI: CPT

## 2023-04-04 PROCEDURE — 77080 DXA BONE DENSITY AXIAL: CPT

## 2023-04-04 PROCEDURE — 77067 SCR MAMMO BI INCL CAD: CPT

## 2023-07-24 ENCOUNTER — TELEPHONE (OUTPATIENT)
Dept: FAMILY MEDICINE CLINIC | Facility: CLINIC | Age: 68
End: 2023-07-24
Payer: MEDICARE

## 2023-07-24 DIAGNOSIS — F41.8 MIXED ANXIETY DEPRESSIVE DISORDER: ICD-10-CM

## 2023-07-24 RX ORDER — VENLAFAXINE HYDROCHLORIDE 75 MG/1
75 CAPSULE, EXTENDED RELEASE ORAL NIGHTLY
Qty: 90 CAPSULE | Refills: 0 | Status: SHIPPED | OUTPATIENT
Start: 2023-07-24

## 2023-07-24 RX ORDER — METFORMIN HYDROCHLORIDE 750 MG/1
750 TABLET, EXTENDED RELEASE ORAL 2 TIMES DAILY
Qty: 180 TABLET | Refills: 1 | Status: SHIPPED | OUTPATIENT
Start: 2023-07-24

## 2023-07-24 NOTE — TELEPHONE ENCOUNTER
Caller: Nanci Benitez    Relationship: Self    Best call back number:[phone]     Requested Prescriptions:   Requested Prescriptions     Pending Prescriptions Disp Refills    metFORMIN ER (GLUCOPHAGE-XR) 750 MG 24 hr tablet 180 tablet 1     Sig: Take 1 tablet by mouth 2 (Two) Times a Day.    venlafaxine XR (EFFEXOR-XR) 75 MG 24 hr capsule 90 capsule 0     Sig: Take 1 capsule by mouth Every Night.        Pharmacy where request should be sent: [facility] [phone] [place], [place] [id number]      Last office visit with prescribing clinician: 3/16/2023   Last telemedicine visit with prescribing clinician: Visit date not found   Next office visit with prescribing clinician: 9/19/2023     Additional details provided by patient:     Does the patient have less than a 3 day supply:  [] Yes  [x] No      Jose Don III, RegSched Rep   07/24/23 13:10 CDT

## 2023-10-19 DIAGNOSIS — F41.8 MIXED ANXIETY DEPRESSIVE DISORDER: ICD-10-CM

## 2023-10-19 RX ORDER — VENLAFAXINE HYDROCHLORIDE 75 MG/1
75 CAPSULE, EXTENDED RELEASE ORAL NIGHTLY
Qty: 90 CAPSULE | Refills: 0 | Status: SHIPPED | OUTPATIENT
Start: 2023-10-19

## 2024-02-05 ENCOUNTER — TELEPHONE (OUTPATIENT)
Dept: VASCULAR SURGERY | Facility: CLINIC | Age: 69
End: 2024-02-05
Payer: MEDICARE

## 2024-02-05 NOTE — TELEPHONE ENCOUNTER
Call placed to patient with no answer, requested call back to reschedule appointment as she has missed her testing.

## 2024-02-05 NOTE — TELEPHONE ENCOUNTER
Called alternate contact and spoke with son, patient [place] be seeking care there.